# Patient Record
Sex: FEMALE | Race: WHITE
[De-identification: names, ages, dates, MRNs, and addresses within clinical notes are randomized per-mention and may not be internally consistent; named-entity substitution may affect disease eponyms.]

---

## 2017-06-12 ENCOUNTER — RESULT REVIEW (OUTPATIENT)
Age: 37
End: 2017-06-12

## 2018-09-04 ENCOUNTER — RESULT REVIEW (OUTPATIENT)
Age: 38
End: 2018-09-04

## 2019-09-18 ENCOUNTER — RESULT REVIEW (OUTPATIENT)
Age: 39
End: 2019-09-18

## 2020-09-21 ENCOUNTER — RESULT REVIEW (OUTPATIENT)
Age: 40
End: 2020-09-21

## 2020-12-28 ENCOUNTER — TRANSCRIPTION ENCOUNTER (OUTPATIENT)
Age: 40
End: 2020-12-28

## 2021-09-20 ENCOUNTER — RESULT REVIEW (OUTPATIENT)
Age: 41
End: 2021-09-20

## 2022-11-30 ENCOUNTER — RESULT REVIEW (OUTPATIENT)
Age: 42
End: 2022-11-30

## 2023-01-04 ENCOUNTER — APPOINTMENT (OUTPATIENT)
Dept: SURGICAL ONCOLOGY | Facility: CLINIC | Age: 43
End: 2023-01-04
Payer: COMMERCIAL

## 2023-01-04 VITALS
HEIGHT: 65 IN | BODY MASS INDEX: 29.49 KG/M2 | RESPIRATION RATE: 16 BRPM | WEIGHT: 177 LBS | HEART RATE: 74 BPM | OXYGEN SATURATION: 99 % | SYSTOLIC BLOOD PRESSURE: 143 MMHG | DIASTOLIC BLOOD PRESSURE: 95 MMHG

## 2023-01-04 DIAGNOSIS — N63.20 UNSPECIFIED LUMP IN THE LEFT BREAST, UNSPECIFIED QUADRANT: ICD-10-CM

## 2023-01-04 PROCEDURE — 99204 OFFICE O/P NEW MOD 45 MIN: CPT

## 2023-01-31 NOTE — ADDENDUM
[FreeTextEntry1] : I, Dania Diallo, acted solely as a scribe for Dr. Arun Benavides on this date 01/04/2023.\par

## 2023-01-31 NOTE — ASSESSMENT
[FreeTextEntry1] : Left breast mass 11:00 likely benign \par BI-RADS 3 left sonogram March 2022\par Reassured patient that index of suspicion for malignancy is low\par Patient is scheduled for her screening mammogram and will get her 6 month follow up sonogram as well\par Will follow up with the patient after imaging \par

## 2023-01-31 NOTE — CONSULT LETTER
[Dear  ___] : Dear  [unfilled], [Consult Letter:] : I had the pleasure of evaluating your patient, [unfilled]. [Please see my note below.] : Please see my note below. [Sincerely,] : Sincerely, [FreeTextEntry3] : Arun Benavides MD FACS\par

## 2023-01-31 NOTE — PHYSICAL EXAM
[Normal] : supple, no neck mass and thyroid not enlarged [Normal Neck Lymph Nodes] : normal neck lymph nodes  [Normal Supraclavicular Lymph Nodes] : normal supraclavicular lymph nodes [Normal Groin Lymph Nodes] : normal groin lymph nodes [Normal Axillary Lymph Nodes] : normal axillary lymph nodes [Normal] : oriented to person, place and time, with appropriate affect [de-identified] : no masses or adenopathy bilaterally

## 2023-01-31 NOTE — HISTORY OF PRESENT ILLNESS
[de-identified] : Patient is a 41 y/o female who presents an initial consultation for a benign-appearing left breast mass. \par She is scheduled for a mammogram this month as well her 6 month follow up sonogram. \par \par Patient had a left breast ultrasound performed on 3/28/22 which showed a hypoechoic mass in 11:00 6 cmfn measuring 1.4 cm without significant change. (BI-RADS 3)\par \par She reports prior left breast needle biopsy.\par Denies any family history of breast cancer.